# Patient Record
Sex: FEMALE | Race: WHITE | NOT HISPANIC OR LATINO | URBAN - METROPOLITAN AREA
[De-identification: names, ages, dates, MRNs, and addresses within clinical notes are randomized per-mention and may not be internally consistent; named-entity substitution may affect disease eponyms.]

---

## 2017-04-24 ENCOUNTER — OUTPATIENT (OUTPATIENT)
Dept: OUTPATIENT SERVICES | Facility: HOSPITAL | Age: 62
LOS: 1 days | End: 2017-04-24

## 2017-04-24 VITALS
DIASTOLIC BLOOD PRESSURE: 89 MMHG | HEART RATE: 88 BPM | RESPIRATION RATE: 15 BRPM | WEIGHT: 169.98 LBS | OXYGEN SATURATION: 98 % | HEIGHT: 68 IN | SYSTOLIC BLOOD PRESSURE: 143 MMHG | TEMPERATURE: 37 F

## 2017-04-24 DIAGNOSIS — M20.41 OTHER HAMMER TOE(S) (ACQUIRED), RIGHT FOOT: ICD-10-CM

## 2017-04-24 DIAGNOSIS — Z01.818 ENCOUNTER FOR OTHER PREPROCEDURAL EXAMINATION: ICD-10-CM

## 2017-04-24 DIAGNOSIS — M20.11 HALLUX VALGUS (ACQUIRED), RIGHT FOOT: ICD-10-CM

## 2017-04-24 DIAGNOSIS — Z90.710 ACQUIRED ABSENCE OF BOTH CERVIX AND UTERUS: Chronic | ICD-10-CM

## 2017-04-24 DIAGNOSIS — Z91.040 LATEX ALLERGY STATUS: ICD-10-CM

## 2017-04-24 DIAGNOSIS — Z98.82 BREAST IMPLANT STATUS: Chronic | ICD-10-CM

## 2017-04-24 DIAGNOSIS — Z98.891 HISTORY OF UTERINE SCAR FROM PREVIOUS SURGERY: Chronic | ICD-10-CM

## 2017-04-24 RX ORDER — LIDOCAINE HCL 20 MG/ML
0.2 VIAL (ML) INJECTION ONCE
Qty: 0 | Refills: 0 | Status: DISCONTINUED | OUTPATIENT
Start: 2017-04-28 | End: 2017-05-13

## 2017-04-24 RX ORDER — SODIUM CHLORIDE 9 MG/ML
3 INJECTION INTRAMUSCULAR; INTRAVENOUS; SUBCUTANEOUS EVERY 8 HOURS
Qty: 0 | Refills: 0 | Status: DISCONTINUED | OUTPATIENT
Start: 2017-04-28 | End: 2017-05-13

## 2017-04-24 NOTE — H&P PST ADULT - HISTORY OF PRESENT ILLNESS
63 yo female.   presents to PST scheduled for bunion and hammer toe surgeries., 63 yo female. c/o right foot pain with wearing tight shoes, evaluated by Dr. Salguero, diagnosed with acquired hallux valgus. presents to PST scheduled for right foot surgery.

## 2017-04-24 NOTE — H&P PST ADULT - NSANTHOSAYNRD_GEN_A_CORE
No. SAMMY screening performed.  STOP BANG Legend: 0-2 = LOW Risk; 3-4 = INTERMEDIATE Risk; 5-8 = HIGH Risk

## 2017-04-24 NOTE — H&P PST ADULT - MUSCULOSKELETAL
details… detailed exam pt did not wish to have right foot examined, stating Dr. Salguero had recently examined it

## 2017-04-24 NOTE — H&P PST ADULT - PMH
Hallux valgus (acquired), right foot    Hammer toe of right foot Hallux valgus (acquired), right foot    Hammer toe of right foot    Raynaud's disease without gangrene    Vasovagal episode

## 2017-04-27 ENCOUNTER — RESULT REVIEW (OUTPATIENT)
Age: 62
End: 2017-04-27

## 2017-04-28 ENCOUNTER — OUTPATIENT (OUTPATIENT)
Dept: OUTPATIENT SERVICES | Facility: HOSPITAL | Age: 62
LOS: 1 days | End: 2017-04-28
Payer: COMMERCIAL

## 2017-04-28 VITALS
HEART RATE: 88 BPM | OXYGEN SATURATION: 98 % | WEIGHT: 169.98 LBS | SYSTOLIC BLOOD PRESSURE: 143 MMHG | RESPIRATION RATE: 18 BRPM | DIASTOLIC BLOOD PRESSURE: 89 MMHG | HEIGHT: 68 IN | TEMPERATURE: 37 F

## 2017-04-28 VITALS
RESPIRATION RATE: 18 BRPM | OXYGEN SATURATION: 100 % | SYSTOLIC BLOOD PRESSURE: 128 MMHG | DIASTOLIC BLOOD PRESSURE: 80 MMHG | HEART RATE: 80 BPM

## 2017-04-28 DIAGNOSIS — Z98.82 BREAST IMPLANT STATUS: Chronic | ICD-10-CM

## 2017-04-28 DIAGNOSIS — Z01.818 ENCOUNTER FOR OTHER PREPROCEDURAL EXAMINATION: ICD-10-CM

## 2017-04-28 DIAGNOSIS — Z90.710 ACQUIRED ABSENCE OF BOTH CERVIX AND UTERUS: Chronic | ICD-10-CM

## 2017-04-28 DIAGNOSIS — Z98.891 HISTORY OF UTERINE SCAR FROM PREVIOUS SURGERY: Chronic | ICD-10-CM

## 2017-04-28 DIAGNOSIS — M20.41 OTHER HAMMER TOE(S) (ACQUIRED), RIGHT FOOT: ICD-10-CM

## 2017-04-28 DIAGNOSIS — M20.11 HALLUX VALGUS (ACQUIRED), RIGHT FOOT: ICD-10-CM

## 2017-04-28 PROCEDURE — C1713: CPT

## 2017-04-28 PROCEDURE — 28296 COR HLX VLGS DSTL MTAR OSTEO: CPT | Mod: T5

## 2017-04-28 PROCEDURE — 73620 X-RAY EXAM OF FOOT: CPT | Mod: 26,RT

## 2017-04-28 PROCEDURE — 73620 X-RAY EXAM OF FOOT: CPT

## 2017-04-28 PROCEDURE — 88300 SURGICAL PATH GROSS: CPT | Mod: 26

## 2017-04-28 PROCEDURE — 28285 REPAIR OF HAMMERTOE: CPT | Mod: T6

## 2017-04-28 PROCEDURE — 88300 SURGICAL PATH GROSS: CPT

## 2017-04-28 RX ORDER — ONDANSETRON 8 MG/1
4 TABLET, FILM COATED ORAL ONCE
Qty: 0 | Refills: 0 | Status: DISCONTINUED | OUTPATIENT
Start: 2017-04-28 | End: 2017-05-13

## 2017-04-28 RX ORDER — APREPITANT 80 MG/1
40 CAPSULE ORAL ONCE
Qty: 0 | Refills: 0 | Status: COMPLETED | OUTPATIENT
Start: 2017-04-28 | End: 2017-04-28

## 2017-04-28 RX ORDER — ACETAMINOPHEN 500 MG
975 TABLET ORAL ONCE
Qty: 0 | Refills: 0 | Status: COMPLETED | OUTPATIENT
Start: 2017-04-28 | End: 2017-04-28

## 2017-04-28 RX ORDER — SODIUM CHLORIDE 9 MG/ML
1000 INJECTION, SOLUTION INTRAVENOUS
Qty: 0 | Refills: 0 | Status: DISCONTINUED | OUTPATIENT
Start: 2017-04-28 | End: 2017-05-13

## 2017-04-28 RX ORDER — CELECOXIB 200 MG/1
200 CAPSULE ORAL ONCE
Qty: 0 | Refills: 0 | Status: COMPLETED | OUTPATIENT
Start: 2017-04-28 | End: 2017-04-28

## 2017-04-28 RX ORDER — CELECOXIB 200 MG/1
200 CAPSULE ORAL ONCE
Qty: 0 | Refills: 0 | Status: DISCONTINUED | OUTPATIENT
Start: 2017-04-28 | End: 2017-05-13

## 2017-04-28 RX ADMIN — APREPITANT 40 MILLIGRAM(S): 80 CAPSULE ORAL at 06:52

## 2017-04-28 RX ADMIN — CELECOXIB 200 MILLIGRAM(S): 200 CAPSULE ORAL at 06:52

## 2017-04-28 RX ADMIN — Medication 975 MILLIGRAM(S): at 06:52

## 2017-04-28 NOTE — BRIEF OPERATIVE NOTE - POST-OP DX
Bunion of great toe of right foot  04/28/2017    Active  Rosaura Ballesteros  Hammer toe of second toe of right foot  04/28/2017    Active  Rosaura Ballesteros

## 2017-04-28 NOTE — ASU DISCHARGE PLAN (ADULT/PEDIATRIC). - NOTIFY
Pain not relieved by Medications/Numbness, color, or temperature change to extremity/Swelling that continues/Bleeding that does not stop/Numbness, tingling/Fever greater than 101/Persistent Nausea and Vomiting

## 2017-04-28 NOTE — ASU PATIENT PROFILE, ADULT - PMH
Hallux valgus (acquired), right foot    Hammer toe of right foot    Raynaud's disease without gangrene    Vasovagal episode

## 2017-04-28 NOTE — BRIEF OPERATIVE NOTE - PRE-OP DX
Bunion of great toe of right foot  04/28/2017    Active  Rosaura Ballesteros  Hammer toe of right foot  04/28/2017    Active  Rosaura Ballesteros

## 2017-05-03 ENCOUNTER — TRANSCRIPTION ENCOUNTER (OUTPATIENT)
Age: 62
End: 2017-05-03

## 2020-08-19 PROBLEM — I73.00 RAYNAUD'S SYNDROME WITHOUT GANGRENE: Chronic | Status: ACTIVE | Noted: 2017-04-24

## 2020-08-24 ENCOUNTER — APPOINTMENT (OUTPATIENT)
Dept: UROLOGY | Facility: CLINIC | Age: 65
End: 2020-08-24
Payer: COMMERCIAL

## 2020-08-24 VITALS
DIASTOLIC BLOOD PRESSURE: 92 MMHG | WEIGHT: 180 LBS | TEMPERATURE: 97.4 F | OXYGEN SATURATION: 97 % | HEART RATE: 83 BPM | BODY MASS INDEX: 26.66 KG/M2 | SYSTOLIC BLOOD PRESSURE: 142 MMHG | HEIGHT: 69 IN | RESPIRATION RATE: 14 BRPM

## 2020-08-24 VITALS
BODY MASS INDEX: 26.66 KG/M2 | RESPIRATION RATE: 14 BRPM | WEIGHT: 180 LBS | OXYGEN SATURATION: 97 % | HEIGHT: 69 IN | DIASTOLIC BLOOD PRESSURE: 92 MMHG | TEMPERATURE: 97.4 F | SYSTOLIC BLOOD PRESSURE: 142 MMHG | HEART RATE: 83 BPM

## 2020-08-24 DIAGNOSIS — Z72.89 OTHER PROBLEMS RELATED TO LIFESTYLE: ICD-10-CM

## 2020-08-24 DIAGNOSIS — E78.00 PURE HYPERCHOLESTEROLEMIA, UNSPECIFIED: ICD-10-CM

## 2020-08-24 DIAGNOSIS — Z86.39 PERSONAL HISTORY OF OTHER ENDOCRINE, NUTRITIONAL AND METABOLIC DISEASE: ICD-10-CM

## 2020-08-24 DIAGNOSIS — Z84.1 FAMILY HISTORY OF DISORDERS OF KIDNEY AND URETER: ICD-10-CM

## 2020-08-24 PROCEDURE — 99203 OFFICE O/P NEW LOW 30 MIN: CPT

## 2020-08-24 RX ORDER — SIMVASTATIN 20 MG/1
20 TABLET, FILM COATED ORAL
Qty: 90 | Refills: 0 | Status: ACTIVE | COMMUNITY
Start: 2020-08-04

## 2020-08-24 RX ORDER — ZOLPIDEM TARTRATE 10 MG/1
10 TABLET ORAL
Qty: 30 | Refills: 0 | Status: ACTIVE | COMMUNITY
Start: 2020-07-27

## 2020-08-24 RX ORDER — ALPRAZOLAM 0.25 MG/1
0.25 TABLET ORAL
Qty: 30 | Refills: 0 | Status: ACTIVE | COMMUNITY
Start: 2020-08-11

## 2020-08-24 NOTE — PHYSICAL EXAM
[General Appearance - Well Developed] : well developed [General Appearance - Well Nourished] : well nourished [Well Groomed] : well groomed [Normal Appearance] : normal appearance [Edema] : no peripheral edema [General Appearance - In No Acute Distress] : no acute distress [Respiration, Rhythm And Depth] : normal respiratory rhythm and effort [Exaggerated Use Of Accessory Muscles For Inspiration] : no accessory muscle use [Abdomen Soft] : soft [Costovertebral Angle Tenderness] : no ~M costovertebral angle tenderness [Abdomen Tenderness] : non-tender [Normal Station and Gait] : the gait and station were normal for the patient's age [] : no rash [No Focal Deficits] : no focal deficits [Oriented To Time, Place, And Person] : oriented to person, place, and time [Affect] : the affect was normal [Mood] : the mood was normal [Not Anxious] : not anxious

## 2020-08-26 LAB
APPEARANCE: ABNORMAL
BACTERIA UR CULT: NORMAL
BACTERIA: NEGATIVE
BILIRUBIN URINE: ABNORMAL
BLOOD URINE: ABNORMAL
COLOR: ABNORMAL
GLUCOSE QUALITATIVE U: ABNORMAL
HYALINE CASTS: 0 /LPF
KETONES URINE: ABNORMAL
LEUKOCYTE ESTERASE URINE: ABNORMAL
MICROSCOPIC-UA: NORMAL
NITRITE URINE: ABNORMAL
PH URINE: ABNORMAL
PROTEIN URINE: ABNORMAL
RED BLOOD CELLS URINE: >720 /HPF
SPECIFIC GRAVITY URINE: ABNORMAL
SQUAMOUS EPITHELIAL CELLS: 1 /HPF
UROBILINOGEN URINE: ABNORMAL
WHITE BLOOD CELLS URINE: 133 /HPF

## 2020-09-02 RX ORDER — PREDNISONE 10 MG/1
10 TABLET ORAL
Qty: 25 | Refills: 0 | Status: DISCONTINUED | COMMUNITY
Start: 2020-02-24 | End: 2020-09-02

## 2020-09-02 RX ORDER — MOMETASONE FUROATE 1 MG/ML
0.1 SOLUTION TOPICAL
Qty: 60 | Refills: 0 | Status: DISCONTINUED | COMMUNITY
Start: 2020-04-13 | End: 2020-09-02

## 2020-09-02 RX ORDER — LEVOFLOXACIN 500 MG/1
500 TABLET, FILM COATED ORAL
Qty: 7 | Refills: 0 | Status: DISCONTINUED | COMMUNITY
Start: 2020-02-28 | End: 2020-09-02

## 2020-09-02 RX ORDER — HYDROXYCHLOROQUINE SULFATE 200 MG/1
200 TABLET, FILM COATED ORAL
Qty: 40 | Refills: 0 | Status: DISCONTINUED | COMMUNITY
Start: 2020-03-20 | End: 2020-09-02

## 2020-09-02 RX ORDER — FLUOXETINE HYDROCHLORIDE 10 MG/1
10 CAPSULE ORAL
Qty: 60 | Refills: 0 | Status: DISCONTINUED | COMMUNITY
Start: 2020-04-16 | End: 2020-09-02

## 2020-09-02 RX ORDER — AZITHROMYCIN 250 MG/1
250 TABLET, FILM COATED ORAL
Qty: 6 | Refills: 0 | Status: DISCONTINUED | COMMUNITY
Start: 2020-03-20 | End: 2020-09-02

## 2020-09-02 RX ORDER — CLOBETASOL PROPIONATE 0.05 G/100ML
0.05 SHAMPOO TOPICAL
Qty: 118 | Refills: 0 | Status: DISCONTINUED | COMMUNITY
Start: 2020-04-13 | End: 2020-09-02

## 2020-09-02 RX ORDER — ONDANSETRON 4 MG/1
4 TABLET, ORALLY DISINTEGRATING ORAL
Qty: 8 | Refills: 0 | Status: DISCONTINUED | COMMUNITY
Start: 2020-08-13 | End: 2020-09-02

## 2020-09-02 RX ORDER — CIPROFLOXACIN HYDROCHLORIDE 500 MG/1
500 TABLET, FILM COATED ORAL
Qty: 18 | Refills: 0 | Status: DISCONTINUED | COMMUNITY
Start: 2020-08-13 | End: 2020-09-02

## 2020-09-02 RX ORDER — FLUOXETINE HYDROCHLORIDE 20 MG/1
20 CAPSULE ORAL
Qty: 60 | Refills: 0 | Status: DISCONTINUED | COMMUNITY
Start: 2020-06-30 | End: 2020-09-02

## 2020-09-02 RX ORDER — BUSPIRONE HYDROCHLORIDE 30 MG/1
30 TABLET ORAL
Qty: 60 | Refills: 0 | Status: DISCONTINUED | COMMUNITY
Start: 2020-03-21 | End: 2020-09-02

## 2020-09-02 RX ORDER — ARIPIPRAZOLE 2 MG/1
2 TABLET ORAL
Qty: 30 | Refills: 0 | Status: DISCONTINUED | COMMUNITY
Start: 2020-03-24 | End: 2020-09-02

## 2020-09-02 NOTE — HISTORY OF PRESENT ILLNESS
[FreeTextEntry1] : She is a 65-year-old woman who is seen today for initial visit. She developed significant right flank pain and went to the emergency room at [facility] in August 13, 2020. She did not have any fever. She was told to have a stone and a stent was placed. She does not have previous history of kidney stones. Prior to this, she did not have significant urinary symptoms. CT scan showed 1-1.5 cm right mid to distal ureteral stone. She was also placed on Cipro for urinary tract infection. Her father had kidney stones.\par Addendum: Records from the hospital showed CT scan without contrast showed an 11 mm right mid ureteral stone, 3.2 cm right adnexal cyst. White blood cell count was 11.1, creatinine 0.9, urine culture showed enterococcus and Escherichia coli. She was prescribed Cipro. 6 x 26 stent was placed on August 13, 2020.

## 2020-09-02 NOTE — ASSESSMENT
[FreeTextEntry1] : Kidney stones may be completely asymptomatic or cause mild to severe flank pain radiating to the front. Renal colic is generally associated with nausea and vomiting. Kidney stones can vary in size and shape. The main types are calcium, uric acid, struvite and cystine stones. Diagnostic studies for nephrolithiasis may include urine studies, imaging studies with CT scan, x-ray or ultrasound. Asymptomatic renal stones could be observed or surgical treatment options may be considered. Small ureteral stones generally can pass spontaneously with pain management, hydration and alpha-blocker therapy. Persistent nausea and vomiting, fever, chills and uncontrolled pain require visit to the emergency room. \par Surgical treatment options are shockwave lithotripsy, laser lithotripsy with ureteroscopy and percutaneous stone removal. After any of these treatments a stent or a drainage catheter may be used. Generally, the location, size of the stone and comorbid factors dictate which treatment is the best option. Risks of the above procedures include fever, chills, urinary retention, injury to the urinary system, staged procedure, etc. She will be scheduled for ureteroscopy and laser lithotripsy in the near future.

## 2020-09-08 ENCOUNTER — OUTPATIENT (OUTPATIENT)
Dept: OUTPATIENT SERVICES | Facility: HOSPITAL | Age: 65
LOS: 1 days | End: 2020-09-08
Payer: COMMERCIAL

## 2020-09-08 VITALS
WEIGHT: 194.01 LBS | SYSTOLIC BLOOD PRESSURE: 162 MMHG | HEART RATE: 94 BPM | RESPIRATION RATE: 18 BRPM | HEIGHT: 69 IN | TEMPERATURE: 99 F | OXYGEN SATURATION: 98 % | DIASTOLIC BLOOD PRESSURE: 83 MMHG

## 2020-09-08 DIAGNOSIS — Z98.890 OTHER SPECIFIED POSTPROCEDURAL STATES: Chronic | ICD-10-CM

## 2020-09-08 DIAGNOSIS — Z98.891 HISTORY OF UTERINE SCAR FROM PREVIOUS SURGERY: Chronic | ICD-10-CM

## 2020-09-08 DIAGNOSIS — Z98.82 BREAST IMPLANT STATUS: Chronic | ICD-10-CM

## 2020-09-08 DIAGNOSIS — Z01.818 ENCOUNTER FOR OTHER PREPROCEDURAL EXAMINATION: ICD-10-CM

## 2020-09-08 DIAGNOSIS — Z90.710 ACQUIRED ABSENCE OF BOTH CERVIX AND UTERUS: Chronic | ICD-10-CM

## 2020-09-08 DIAGNOSIS — N20.0 CALCULUS OF KIDNEY: ICD-10-CM

## 2020-09-08 NOTE — H&P PST ADULT - NSICDXPASTSURGICALHX_GEN_ALL_CORE_FT
PAST SURGICAL HISTORY:  H/O breast augmentation     H/O: hysterectomy     History of  section x2    S/P bunionectomy

## 2020-09-08 NOTE — H&P PST ADULT - NSICDXPROBLEM_GEN_ALL_CORE_FT
PROBLEM DIAGNOSES  Problem: Renal calculus  Assessment and Plan: Cystoscopy, right semirigid and flexible ureteroscopy, laser lithotripsy, stent placement, possible ESWL

## 2020-09-08 NOTE — H&P PST ADULT - HISTORY OF PRESENT ILLNESS
This is a 64 y/o female PMH presented to [place] with right flank pain and found to have nephrolithiasis, S/P right ureteral stent placement.  Presents today for cystoscopy, right semirigid and flexible ureteroscopy, laser lithotripsy and stent placement This is a 66 y/o female PMH presented to [facility] with right flank pain and found to have nephrolithiasis, S/P right ureteral stent placement.  Presents today for cystoscopy, right semirigid and flexible ureteroscopy, laser lithotripsy and stent placement, possible ESWL.

## 2020-09-08 NOTE — H&P PST ADULT - NSICDXPASTMEDICALHX_GEN_ALL_CORE_FT
PAST MEDICAL HISTORY:  Hallux valgus (acquired), right foot     Hammer toe of right foot     Raynaud's disease without gangrene     Vasovagal episode

## 2020-09-10 DIAGNOSIS — Z01.818 ENCOUNTER FOR OTHER PREPROCEDURAL EXAMINATION: ICD-10-CM

## 2020-09-12 ENCOUNTER — APPOINTMENT (OUTPATIENT)
Dept: DISASTER EMERGENCY | Facility: CLINIC | Age: 65
End: 2020-09-12

## 2020-09-12 ENCOUNTER — LABORATORY RESULT (OUTPATIENT)
Age: 65
End: 2020-09-12

## 2020-09-14 ENCOUNTER — TRANSCRIPTION ENCOUNTER (OUTPATIENT)
Age: 65
End: 2020-09-14

## 2020-09-15 ENCOUNTER — RESULT REVIEW (OUTPATIENT)
Age: 65
End: 2020-09-15

## 2020-09-15 ENCOUNTER — APPOINTMENT (OUTPATIENT)
Dept: UROLOGY | Facility: HOSPITAL | Age: 65
End: 2020-09-15

## 2020-09-15 ENCOUNTER — OUTPATIENT (OUTPATIENT)
Dept: OUTPATIENT SERVICES | Facility: HOSPITAL | Age: 65
LOS: 1 days | End: 2020-09-15
Payer: COMMERCIAL

## 2020-09-15 VITALS
SYSTOLIC BLOOD PRESSURE: 125 MMHG | DIASTOLIC BLOOD PRESSURE: 87 MMHG | RESPIRATION RATE: 17 BRPM | HEIGHT: 69 IN | TEMPERATURE: 98 F | WEIGHT: 194.01 LBS | OXYGEN SATURATION: 95 % | HEART RATE: 90 BPM

## 2020-09-15 VITALS
SYSTOLIC BLOOD PRESSURE: 135 MMHG | OXYGEN SATURATION: 92 % | RESPIRATION RATE: 16 BRPM | DIASTOLIC BLOOD PRESSURE: 74 MMHG | HEART RATE: 100 BPM

## 2020-09-15 DIAGNOSIS — Z98.891 HISTORY OF UTERINE SCAR FROM PREVIOUS SURGERY: Chronic | ICD-10-CM

## 2020-09-15 DIAGNOSIS — Z98.890 OTHER SPECIFIED POSTPROCEDURAL STATES: Chronic | ICD-10-CM

## 2020-09-15 DIAGNOSIS — Z98.82 BREAST IMPLANT STATUS: Chronic | ICD-10-CM

## 2020-09-15 DIAGNOSIS — Z90.710 ACQUIRED ABSENCE OF BOTH CERVIX AND UTERUS: Chronic | ICD-10-CM

## 2020-09-15 DIAGNOSIS — N20.0 CALCULUS OF KIDNEY: ICD-10-CM

## 2020-09-15 DIAGNOSIS — N20.9 URINARY CALCULUS, UNSPECIFIED: ICD-10-CM

## 2020-09-15 PROCEDURE — 87186 SC STD MICRODIL/AGAR DIL: CPT

## 2020-09-15 PROCEDURE — 74420 UROGRAPHY RTRGR +-KUB: CPT | Mod: 26

## 2020-09-15 PROCEDURE — 76000 FLUOROSCOPY <1 HR PHYS/QHP: CPT

## 2020-09-15 PROCEDURE — 52356 CYSTO/URETERO W/LITHOTRIPSY: CPT | Mod: RT

## 2020-09-15 PROCEDURE — 74018 RADEX ABDOMEN 1 VIEW: CPT | Mod: 26

## 2020-09-15 PROCEDURE — 87086 URINE CULTURE/COLONY COUNT: CPT

## 2020-09-15 PROCEDURE — 88300 SURGICAL PATH GROSS: CPT | Mod: 26

## 2020-09-15 PROCEDURE — G0463: CPT

## 2020-09-15 RX ORDER — ONDANSETRON 8 MG/1
4 TABLET, FILM COATED ORAL ONCE
Refills: 0 | Status: DISCONTINUED | OUTPATIENT
Start: 2020-09-15 | End: 2020-09-15

## 2020-09-15 RX ORDER — LIDOCAINE HCL 20 MG/ML
0.2 VIAL (ML) INJECTION ONCE
Refills: 0 | Status: COMPLETED | OUTPATIENT
Start: 2020-09-15 | End: 2020-09-15

## 2020-09-15 RX ORDER — SODIUM CHLORIDE 9 MG/ML
3 INJECTION INTRAMUSCULAR; INTRAVENOUS; SUBCUTANEOUS EVERY 8 HOURS
Refills: 0 | Status: DISCONTINUED | OUTPATIENT
Start: 2020-09-15 | End: 2020-09-15

## 2020-09-15 RX ORDER — OXYCODONE HYDROCHLORIDE 5 MG/1
5 TABLET ORAL ONCE
Refills: 0 | Status: DISCONTINUED | OUTPATIENT
Start: 2020-09-15 | End: 2020-09-15

## 2020-09-15 RX ORDER — HYDROMORPHONE HYDROCHLORIDE 2 MG/ML
0.25 INJECTION INTRAMUSCULAR; INTRAVENOUS; SUBCUTANEOUS
Refills: 0 | Status: DISCONTINUED | OUTPATIENT
Start: 2020-09-15 | End: 2020-09-15

## 2020-09-15 RX ORDER — CIPROFLOXACIN LACTATE 400MG/40ML
400 VIAL (ML) INTRAVENOUS ONCE
Refills: 0 | Status: DISCONTINUED | OUTPATIENT
Start: 2020-09-15 | End: 2020-09-15

## 2020-09-15 RX ORDER — TRAMADOL HYDROCHLORIDE 50 MG/1
1 TABLET ORAL
Qty: 5 | Refills: 0
Start: 2020-09-15

## 2020-09-15 RX ADMIN — OXYCODONE HYDROCHLORIDE 5 MILLIGRAM(S): 5 TABLET ORAL at 15:44

## 2020-09-15 RX ADMIN — SODIUM CHLORIDE 3 MILLILITER(S): 9 INJECTION INTRAMUSCULAR; INTRAVENOUS; SUBCUTANEOUS at 11:21

## 2020-09-15 RX ADMIN — Medication 0.2 MILLILITER(S): at 11:21

## 2020-09-15 NOTE — ASU DISCHARGE PLAN (ADULT/PEDIATRIC) - CALL YOUR DOCTOR IF YOU HAVE ANY OF THE FOLLOWING:
Fever greater than (need to indicate Fahrenheit or Celsius)/Inability to tolerate liquids or foods/Bleeding that does not stop/Pain not relieved by Medications/Unable to urinate 0 = independent

## 2020-09-15 NOTE — ASU DISCHARGE PLAN (ADULT/PEDIATRIC) - ASU DC SPECIAL INSTRUCTIONSFT
You may take up to 650mg of tylenol every 6 hours for pain.  You can alternate this with ibuprofen 400mg every 6 hours.  Do not take more than 4000mg of tylenol or 2400mg of ibuprofen in one day.    Please follow-up with Patrick Pagan in 1-2 weeks. You may take up to 650mg of tylenol every 6 hours for pain.  You can alternate this with ibuprofen 400mg every 6 hours.  Do not take more than 4000mg of tylenol or 2400mg of ibuprofen in one day.    Please follow-up with Patrick Pagan in 1-2 weeks.    Do not take the Tramadol with either Ambien or Xanax

## 2020-09-15 NOTE — ASU DISCHARGE PLAN (ADULT/PEDIATRIC) - CARE PROVIDER_API CALL
Byron Marin)  Urology  [address], [place] [address]  Phone: [phone]  Fax: [phone]  Follow Up Time:

## 2020-09-15 NOTE — BRIEF OPERATIVE NOTE - NSICDXBRIEFPROCEDURE_GEN_ALL_CORE_FT
PROCEDURES:  Left ureteroscopy with lithotripsy 15-Sep-2020 13:53:52  Asa Man   PROCEDURES:  Ureteroscopy with laser lithotripsy 15-Sep-2020 13:58:22  Asa Man

## 2020-09-16 PROCEDURE — 74018 RADEX ABDOMEN 1 VIEW: CPT

## 2020-09-16 PROCEDURE — 87086 URINE CULTURE/COLONY COUNT: CPT

## 2020-09-16 PROCEDURE — C1769: CPT

## 2020-09-16 PROCEDURE — 88300 SURGICAL PATH GROSS: CPT

## 2020-09-16 PROCEDURE — C1758: CPT

## 2020-09-16 PROCEDURE — 52356 CYSTO/URETERO W/LITHOTRIPSY: CPT | Mod: RT

## 2020-09-16 PROCEDURE — C2617: CPT

## 2020-09-16 PROCEDURE — C1889: CPT

## 2020-09-16 PROCEDURE — 82365 CALCULUS SPECTROSCOPY: CPT

## 2020-09-18 ENCOUNTER — APPOINTMENT (OUTPATIENT)
Dept: UROLOGY | Facility: CLINIC | Age: 65
End: 2020-09-18
Payer: COMMERCIAL

## 2020-09-18 DIAGNOSIS — N20.0 CALCULUS OF KIDNEY: ICD-10-CM

## 2020-09-18 LAB
CULTURE RESULTS: SIGNIFICANT CHANGE UP
SPECIMEN SOURCE: SIGNIFICANT CHANGE UP

## 2020-09-18 PROCEDURE — 52310 CYSTOSCOPY AND TREATMENT: CPT

## 2020-09-18 RX ORDER — CIPROFLOXACIN HYDROCHLORIDE 500 MG/1
500 TABLET, FILM COATED ORAL
Qty: 10 | Refills: 0 | Status: DISCONTINUED | COMMUNITY
Start: 2020-09-11 | End: 2020-09-18

## 2020-09-22 LAB — NIDUS STONE QN: SIGNIFICANT CHANGE UP

## 2020-09-23 LAB — SURGICAL PATHOLOGY STUDY: SIGNIFICANT CHANGE UP

## 2020-11-06 ENCOUNTER — TRANSCRIPTION ENCOUNTER (OUTPATIENT)
Age: 65
End: 2020-11-06

## 2020-11-06 ENCOUNTER — APPOINTMENT (OUTPATIENT)
Dept: SURGERY | Facility: CLINIC | Age: 65
End: 2020-11-06
Payer: COMMERCIAL

## 2020-11-06 VITALS
HEART RATE: 66 BPM | RESPIRATION RATE: 16 BRPM | WEIGHT: 202 LBS | TEMPERATURE: 98 F | HEIGHT: 68 IN | SYSTOLIC BLOOD PRESSURE: 159 MMHG | BODY MASS INDEX: 30.62 KG/M2 | DIASTOLIC BLOOD PRESSURE: 91 MMHG | OXYGEN SATURATION: 98 %

## 2020-11-06 PROCEDURE — 99204 OFFICE O/P NEW MOD 45 MIN: CPT

## 2020-11-06 PROCEDURE — 99072 ADDL SUPL MATRL&STAF TM PHE: CPT

## 2020-11-06 RX ORDER — TAMSULOSIN HYDROCHLORIDE 0.4 MG/1
0.4 CAPSULE ORAL
Qty: 14 | Refills: 0 | Status: DISCONTINUED | COMMUNITY
Start: 2020-08-13 | End: 2020-11-06

## 2020-11-06 RX ORDER — VENLAFAXINE HYDROCHLORIDE 37.5 MG/1
37.5 CAPSULE, EXTENDED RELEASE ORAL
Qty: 90 | Refills: 0 | Status: DISCONTINUED | COMMUNITY
Start: 2020-07-28 | End: 2020-11-06

## 2020-11-06 NOTE — HISTORY OF PRESENT ILLNESS
[de-identified] : Lashon is a 66 y/o female here for consultation visit. CT from 11/6/20 demonstrated a supraumbilical midline anterior abdominal wall hernia. There is fat present within the hernia sac which demonstrates streaky increased attenuation consistent with inflammation.  [de-identified] : For more than a year patient has been experiencing a sense of abdominal bloating and relatively early satiety. She was seen by GI and underwent a CT scan which showed a small fat containing umbilical hernia and a somewhat larger fat containing epigastric hernia. She is also known to have an element of diastasis and at times has experienced some "burning" pain in the area of the epigastric bulge. In addition, she has gained at least 30 pounds over the last 2-3 years. Bowel movements remain normal.

## 2020-11-06 NOTE — ASSESSMENT
[FreeTextEntry1] : 65-year-old overweight female with diastasis recti, epigastric and umbilical hernias with main complaint of abdominal bloating and early satiety. Her complaints are unlikely to be related to either hernia but more likely related to her significant weight gain.

## 2020-11-06 NOTE — PLAN
[FreeTextEntry1] : Lengthy discussion with patient and  regarding the nature of, indications for and risks/benefits of epigastric and umbilical hernia repair. Also discussed possibility of coincident procedure with plastics to repair the diastasis and perform abdominoplasty. Patient advised to follow up with her plastic surgeon regarding above-mentioned procedures and then decide how she would prefer to proceed.

## 2020-11-06 NOTE — CONSULT LETTER
[Dear  ___] : Dear ~MORGAN, [Sincerely,] : Sincerely, [FreeTextEntry2] : Dr. Babak Hazel [FreeTextEntry1] : At your recommendation I saw Lashon Baker in the office on November sixth for evaluation of abdominal wall hernias. She stated that for the last year or so she has been experiencing postprandial abdominal bloating and early satiety. She was seen by her gastroenterologist and eventually referred for a CT scan which demonstrated a small, fat-containing umbilical hernia as well as a somewhat larger, fat containing epigastric hernia. In addition to the above-mentioned complaints Ms. Baker has also noted occasional local discomfort in the area of the epigastric bulge and she admitted to a more than 30 pound weight gain over the past several years.\par \par On exam, the patient was noted to be overweight. The abdomen was soft, nontender and non-distended though somewhat protuberant. No masses or organomegaly were palpable but a moderate degree of upper midline diastasis was present. At the umbilicus, a 1.5 cm nontender, reducible, fat-containing hernia was apparent and several centimeters above the umbilicus, a 4-5 cm, nontender, reducible epigastric hernia was also noted. The remainder of the exam was noncontributory except for the presence of a well-healed lower midline surgical scar related to previous  x2.\par \par I discussed the situation at considerable length with Ms. Baker and her  and while I do feel that the epigastric and umbilical hernias should be repaired, I am concerned that most of her symptoms of bloating and early satiety are primarily related to her weight gain. In any case, I suggested that she follow up with you for a discussion regarding possible abdominoplasty and diastasis repair after which decision can be made as to which if any procedures will be undertaken. Thanks very much for allowing me to see this pleasant woman's and I look forward to the possibility of participating with you in her care. [FreeTextEntry3] : \par \par Frankie Marcus M.D., F.A.C.S. [DrPatrick  ___] : Dr. KAHN

## 2020-11-23 ENCOUNTER — OUTPATIENT (OUTPATIENT)
Dept: OUTPATIENT SERVICES | Facility: HOSPITAL | Age: 65
LOS: 1 days | End: 2020-11-23
Payer: COMMERCIAL

## 2020-11-23 VITALS
TEMPERATURE: 98 F | SYSTOLIC BLOOD PRESSURE: 125 MMHG | HEART RATE: 83 BPM | OXYGEN SATURATION: 98 % | DIASTOLIC BLOOD PRESSURE: 70 MMHG | WEIGHT: 199.08 LBS | RESPIRATION RATE: 16 BRPM | HEIGHT: 68.5 IN

## 2020-11-23 DIAGNOSIS — K46.9 UNSPECIFIED ABDOMINAL HERNIA WITHOUT OBSTRUCTION OR GANGRENE: ICD-10-CM

## 2020-11-23 DIAGNOSIS — Z98.891 HISTORY OF UTERINE SCAR FROM PREVIOUS SURGERY: Chronic | ICD-10-CM

## 2020-11-23 DIAGNOSIS — N20.0 CALCULUS OF KIDNEY: Chronic | ICD-10-CM

## 2020-11-23 DIAGNOSIS — Z01.818 ENCOUNTER FOR OTHER PREPROCEDURAL EXAMINATION: ICD-10-CM

## 2020-11-23 DIAGNOSIS — Z98.890 OTHER SPECIFIED POSTPROCEDURAL STATES: Chronic | ICD-10-CM

## 2020-11-23 DIAGNOSIS — K43.9 VENTRAL HERNIA WITHOUT OBSTRUCTION OR GANGRENE: ICD-10-CM

## 2020-11-23 DIAGNOSIS — Z98.82 BREAST IMPLANT STATUS: Chronic | ICD-10-CM

## 2020-11-23 DIAGNOSIS — K42.9 UMBILICAL HERNIA WITHOUT OBSTRUCTION OR GANGRENE: ICD-10-CM

## 2020-11-23 DIAGNOSIS — Z90.710 ACQUIRED ABSENCE OF BOTH CERVIX AND UTERUS: Chronic | ICD-10-CM

## 2020-11-23 LAB
ANION GAP SERPL CALC-SCNC: 16 MMOL/L — SIGNIFICANT CHANGE UP (ref 5–17)
BUN SERPL-MCNC: 11 MG/DL — SIGNIFICANT CHANGE UP (ref 7–23)
CALCIUM SERPL-MCNC: 10 MG/DL — SIGNIFICANT CHANGE UP (ref 8.4–10.5)
CHLORIDE SERPL-SCNC: 104 MMOL/L — SIGNIFICANT CHANGE UP (ref 96–108)
CO2 SERPL-SCNC: 20 MMOL/L — LOW (ref 22–31)
CREAT SERPL-MCNC: 0.68 MG/DL — SIGNIFICANT CHANGE UP (ref 0.5–1.3)
GLUCOSE SERPL-MCNC: 99 MG/DL — SIGNIFICANT CHANGE UP (ref 70–99)
HCT VFR BLD CALC: 46 % — HIGH (ref 34.5–45)
HGB BLD-MCNC: 15.6 G/DL — HIGH (ref 11.5–15.5)
MCHC RBC-ENTMCNC: 32.1 PG — SIGNIFICANT CHANGE UP (ref 27–34)
MCHC RBC-ENTMCNC: 33.9 GM/DL — SIGNIFICANT CHANGE UP (ref 32–36)
MCV RBC AUTO: 94.7 FL — SIGNIFICANT CHANGE UP (ref 80–100)
NRBC # BLD: 0 /100 WBCS — SIGNIFICANT CHANGE UP (ref 0–0)
PLATELET # BLD AUTO: 333 K/UL — SIGNIFICANT CHANGE UP (ref 150–400)
POTASSIUM SERPL-MCNC: 4.3 MMOL/L — SIGNIFICANT CHANGE UP (ref 3.5–5.3)
POTASSIUM SERPL-SCNC: 4.3 MMOL/L — SIGNIFICANT CHANGE UP (ref 3.5–5.3)
RBC # BLD: 4.86 M/UL — SIGNIFICANT CHANGE UP (ref 3.8–5.2)
RBC # FLD: 11.9 % — SIGNIFICANT CHANGE UP (ref 10.3–14.5)
SODIUM SERPL-SCNC: 140 MMOL/L — SIGNIFICANT CHANGE UP (ref 135–145)
WBC # BLD: 6.74 K/UL — SIGNIFICANT CHANGE UP (ref 3.8–10.5)
WBC # FLD AUTO: 6.74 K/UL — SIGNIFICANT CHANGE UP (ref 3.8–10.5)

## 2020-11-23 PROCEDURE — G0463: CPT

## 2020-11-23 PROCEDURE — 87641 MR-STAPH DNA AMP PROBE: CPT

## 2020-11-23 PROCEDURE — 80048 BASIC METABOLIC PNL TOTAL CA: CPT

## 2020-11-23 PROCEDURE — 87640 STAPH A DNA AMP PROBE: CPT

## 2020-11-23 PROCEDURE — 85027 COMPLETE CBC AUTOMATED: CPT

## 2020-11-23 NOTE — H&P PST ADULT - ATTENDING PHYSICIAN: I HAVE REVIEWED THE CLINICAL DOCUMENTATION AND AGREE WITH THE ABOVE NOTE
Giving oral antibiotic. Will watch baby for any signs of allergies due to mothers allergy to PCN.  
No sign of allergic reaction noted after watching baby for 30 minutes.  
Statement Selected

## 2020-11-23 NOTE — H&P PST ADULT - NSICDXPASTSURGICALHX_GEN_ALL_CORE_FT
PAST SURGICAL HISTORY:  H/O breast augmentation implants placed    H/O: hysterectomy h/o removal benign tumor    History of  section x2    Renal calculi s/p R ESWL /Cystoscopy 2020    S/P bunionectomy 2017 with hammertoe repair     PAST SURGICAL HISTORY:  H/O breast augmentation implants placed    H/O: hysterectomy h/o removal benign tumor    History of  section x2    Renal calculi s/p Cystoscopy/laser lithotripsy  2020    S/P bunionectomy 2017 with hammertoe repair

## 2020-11-23 NOTE — H&P PST ADULT - NSICDXPROBLEM_GEN_ALL_CORE_FT
PROBLEM DIAGNOSES  Problem: Abdominal hernia  Assessment and Plan: Epigastric hernia repair  umbilical hernia repair  Preop covid tesiing on 11/27/2020  preop cbc, bmp, MRSA/MSSA(nasal swab ) sent

## 2020-11-23 NOTE — H&P PST ADULT - HISTORY OF PRESENT ILLNESS
65 year old female with  "bloating &  abdominal discomfort for a year" & S/p GI consult/ CT scan of abdomen demonstrated supraumbilical anterior abdominal wall hernia .Presents for scheduled Epigastric hernia repair &  umbilical hernia repair on 11/30/2020.  **** Preop Covid testing on 11/27/2020@ [facility]. 65 year old female s/p ureteroscopy & laser lithotripsy(09/2020) for right renal calculi & now with  "bloating &  abdominal discomfort for a year" & S/p GI consult/ CT scan of abdomen demonstrated supraumbilical anterior abdominal wall hernia  & presents for scheduled Epigastric hernia repair &  umbilical hernia repair on 11/30/2020.  **** [facility] 65 year [facility] female s/p ureteroscopy & laser lithotripsy(09/2020) for right renal calculi & now with  "bloating & abdominal discomfort for a year" & S/p GI consult/ CT scan of abdomen demonstrated supraumbilical anterior abdominal wall hernia  & presents for scheduled Epigastric hernia repair &  umbilical hernia repair on 11/30/2020.  **** Preop Covid testing on 11/27/2020@ [facility].

## 2020-11-23 NOTE — H&P PST ADULT - NSICDXPASTMEDICALHX_GEN_ALL_CORE_FT
PAST MEDICAL HISTORY:  Anxiety     H/O renal calculi     Hallux valgus (acquired), right foot     Hammer toe of right foot h/o surgery    Raynaud's disease without gangrene     Vasovagal episode 5 years ago  h/o cardiac work up done, came out normal'     PAST MEDICAL HISTORY:  Anxiety     H/O renal calculi s/p right laser lithotripsy 09/2020    Hallux valgus (acquired), right foot s/p surgery    Hammer toe of right foot h/o surgery    HLD (hyperlipidemia)     Raynaud's disease without gangrene     Vasovagal episode 5 years ago  h/o cardiac work up done, came out normal'

## 2020-11-24 LAB
MRSA PCR RESULT.: SIGNIFICANT CHANGE UP
S AUREUS DNA NOSE QL NAA+PROBE: SIGNIFICANT CHANGE UP

## 2020-11-27 ENCOUNTER — OUTPATIENT (OUTPATIENT)
Dept: OUTPATIENT SERVICES | Facility: HOSPITAL | Age: 65
LOS: 1 days | End: 2020-11-27
Payer: COMMERCIAL

## 2020-11-27 DIAGNOSIS — N20.0 CALCULUS OF KIDNEY: Chronic | ICD-10-CM

## 2020-11-27 DIAGNOSIS — Z98.82 BREAST IMPLANT STATUS: Chronic | ICD-10-CM

## 2020-11-27 DIAGNOSIS — Z98.890 OTHER SPECIFIED POSTPROCEDURAL STATES: Chronic | ICD-10-CM

## 2020-11-27 DIAGNOSIS — Z11.59 ENCOUNTER FOR SCREENING FOR OTHER VIRAL DISEASES: ICD-10-CM

## 2020-11-27 DIAGNOSIS — Z98.891 HISTORY OF UTERINE SCAR FROM PREVIOUS SURGERY: Chronic | ICD-10-CM

## 2020-11-27 DIAGNOSIS — Z90.710 ACQUIRED ABSENCE OF BOTH CERVIX AND UTERUS: Chronic | ICD-10-CM

## 2020-11-27 LAB — SARS-COV-2 RNA SPEC QL NAA+PROBE: SIGNIFICANT CHANGE UP

## 2020-11-27 PROCEDURE — U0003: CPT

## 2020-11-29 ENCOUNTER — TRANSCRIPTION ENCOUNTER (OUTPATIENT)
Age: 65
End: 2020-11-29

## 2020-11-30 ENCOUNTER — OUTPATIENT (OUTPATIENT)
Dept: OUTPATIENT SERVICES | Facility: HOSPITAL | Age: 65
LOS: 1 days | End: 2020-11-30
Payer: COMMERCIAL

## 2020-11-30 ENCOUNTER — APPOINTMENT (OUTPATIENT)
Dept: SURGERY | Facility: HOSPITAL | Age: 65
End: 2020-11-30
Payer: COMMERCIAL

## 2020-11-30 VITALS
OXYGEN SATURATION: 97 % | HEART RATE: 91 BPM | TEMPERATURE: 98 F | HEIGHT: 68.5 IN | WEIGHT: 199.08 LBS | SYSTOLIC BLOOD PRESSURE: 143 MMHG | DIASTOLIC BLOOD PRESSURE: 86 MMHG | RESPIRATION RATE: 18 BRPM

## 2020-11-30 VITALS
HEART RATE: 90 BPM | TEMPERATURE: 97 F | SYSTOLIC BLOOD PRESSURE: 133 MMHG | OXYGEN SATURATION: 100 % | DIASTOLIC BLOOD PRESSURE: 76 MMHG | RESPIRATION RATE: 16 BRPM

## 2020-11-30 DIAGNOSIS — Z90.710 ACQUIRED ABSENCE OF BOTH CERVIX AND UTERUS: Chronic | ICD-10-CM

## 2020-11-30 DIAGNOSIS — Z98.82 BREAST IMPLANT STATUS: Chronic | ICD-10-CM

## 2020-11-30 DIAGNOSIS — Z98.891 HISTORY OF UTERINE SCAR FROM PREVIOUS SURGERY: Chronic | ICD-10-CM

## 2020-11-30 DIAGNOSIS — Z98.890 OTHER SPECIFIED POSTPROCEDURAL STATES: Chronic | ICD-10-CM

## 2020-11-30 DIAGNOSIS — K43.9 VENTRAL HERNIA WITHOUT OBSTRUCTION OR GANGRENE: ICD-10-CM

## 2020-11-30 DIAGNOSIS — N20.0 CALCULUS OF KIDNEY: Chronic | ICD-10-CM

## 2020-11-30 DIAGNOSIS — K42.9 UMBILICAL HERNIA WITHOUT OBSTRUCTION OR GANGRENE: ICD-10-CM

## 2020-11-30 PROCEDURE — 49585: CPT

## 2020-11-30 PROCEDURE — C1781: CPT

## 2020-11-30 PROCEDURE — 49570: CPT

## 2020-11-30 PROCEDURE — C9399: CPT

## 2020-11-30 RX ORDER — VENLAFAXINE HCL 75 MG
1 CAPSULE, EXT RELEASE 24 HR ORAL
Qty: 0 | Refills: 0 | DISCHARGE

## 2020-11-30 RX ORDER — OXYCODONE HYDROCHLORIDE 5 MG/1
5 TABLET ORAL ONCE
Refills: 0 | Status: DISCONTINUED | OUTPATIENT
Start: 2020-11-30 | End: 2020-11-30

## 2020-11-30 RX ORDER — DIPHENHYDRAMINE HCL 50 MG
0 CAPSULE ORAL
Qty: 0 | Refills: 0 | DISCHARGE

## 2020-11-30 RX ORDER — OXYCODONE HYDROCHLORIDE 5 MG/1
1 TABLET ORAL
Qty: 15 | Refills: 0
Start: 2020-11-30

## 2020-11-30 RX ORDER — ONDANSETRON 8 MG/1
4 TABLET, FILM COATED ORAL ONCE
Refills: 0 | Status: DISCONTINUED | OUTPATIENT
Start: 2020-11-30 | End: 2020-11-30

## 2020-11-30 RX ORDER — LIDOCAINE HCL 20 MG/ML
0.2 VIAL (ML) INJECTION ONCE
Refills: 0 | Status: DISCONTINUED | OUTPATIENT
Start: 2020-11-30 | End: 2020-11-30

## 2020-11-30 RX ORDER — HYDROMORPHONE HYDROCHLORIDE 2 MG/ML
0.25 INJECTION INTRAMUSCULAR; INTRAVENOUS; SUBCUTANEOUS
Refills: 0 | Status: DISCONTINUED | OUTPATIENT
Start: 2020-11-30 | End: 2020-11-30

## 2020-11-30 RX ORDER — SIMVASTATIN 20 MG/1
1 TABLET, FILM COATED ORAL
Qty: 0 | Refills: 0 | DISCHARGE

## 2020-11-30 RX ORDER — KETOROLAC TROMETHAMINE 30 MG/ML
30 SYRINGE (ML) INJECTION ONCE
Refills: 0 | Status: DISCONTINUED | OUTPATIENT
Start: 2020-11-30 | End: 2020-11-30

## 2020-11-30 RX ORDER — APREPITANT 80 MG/1
40 CAPSULE ORAL ONCE
Refills: 0 | Status: COMPLETED | OUTPATIENT
Start: 2020-11-30 | End: 2020-11-30

## 2020-11-30 RX ORDER — SODIUM CHLORIDE 9 MG/ML
3 INJECTION INTRAMUSCULAR; INTRAVENOUS; SUBCUTANEOUS EVERY 8 HOURS
Refills: 0 | Status: DISCONTINUED | OUTPATIENT
Start: 2020-11-30 | End: 2020-11-30

## 2020-11-30 RX ORDER — LORATADINE, PSEUDOEPHEDRINE SULFATE 5; 120 MG/1; MG/1
1 TABLET, FILM COATED, EXTENDED RELEASE ORAL
Qty: 0 | Refills: 0 | DISCHARGE

## 2020-11-30 RX ORDER — FENTANYL CITRATE 50 UG/ML
50 INJECTION INTRAVENOUS
Refills: 0 | Status: DISCONTINUED | OUTPATIENT
Start: 2020-11-30 | End: 2020-11-30

## 2020-11-30 RX ORDER — CHLORHEXIDINE GLUCONATE 213 G/1000ML
1 SOLUTION TOPICAL ONCE
Refills: 0 | Status: DISCONTINUED | OUTPATIENT
Start: 2020-11-30 | End: 2020-11-30

## 2020-11-30 RX ORDER — ALPRAZOLAM 0.25 MG
1 TABLET ORAL
Qty: 0 | Refills: 0 | DISCHARGE

## 2020-11-30 RX ORDER — ZOLPIDEM TARTRATE 10 MG/1
1 TABLET ORAL
Qty: 0 | Refills: 0 | DISCHARGE

## 2020-11-30 RX ADMIN — Medication 30 MILLIGRAM(S): at 18:46

## 2020-11-30 RX ADMIN — OXYCODONE HYDROCHLORIDE 5 MILLIGRAM(S): 5 TABLET ORAL at 15:21

## 2020-11-30 RX ADMIN — HYDROMORPHONE HYDROCHLORIDE 0.25 MILLIGRAM(S): 2 INJECTION INTRAMUSCULAR; INTRAVENOUS; SUBCUTANEOUS at 14:30

## 2020-11-30 RX ADMIN — OXYCODONE HYDROCHLORIDE 5 MILLIGRAM(S): 5 TABLET ORAL at 16:00

## 2020-11-30 RX ADMIN — APREPITANT 40 MILLIGRAM(S): 80 CAPSULE ORAL at 10:33

## 2020-11-30 RX ADMIN — Medication 30 MILLIGRAM(S): at 18:26

## 2020-11-30 RX ADMIN — HYDROMORPHONE HYDROCHLORIDE 0.25 MILLIGRAM(S): 2 INJECTION INTRAMUSCULAR; INTRAVENOUS; SUBCUTANEOUS at 14:21

## 2020-11-30 NOTE — ASU DISCHARGE PLAN (ADULT/PEDIATRIC) - CARE PROVIDER_API CALL
Frankie Marcus J  SURGERY  [address]  [place], [place] [id number]  Phone: [phone]  Fax: [phone]  Follow Up Time:

## 2020-11-30 NOTE — BRIEF OPERATIVE NOTE - NSICDXBRIEFPROCEDURE_GEN_ALL_CORE_FT
PROCEDURES:  Repair of epigastric, ventral, or incisional hernia of abdominal wall using mesh 30-Nov-2020 14:14:18  Nettie Rocha  Repair of umbilical hernia in adult 30-Nov-2020 14:14:01  Nettie Rocha

## 2020-11-30 NOTE — PRE-ANESTHESIA EVALUATION ADULT - NSANTHPMHFT_GEN_ALL_CORE
66 y/o F with PMHx of Raynaud Disease, HLD, renal calculus s/p laser lithotripsy 09/2020, anxiety presents for elective repair of epigastric hernia with mesh.  Covid Negative 11/27.

## 2020-11-30 NOTE — PRE-ANESTHESIA EVALUATION ADULT - NSANTHPEFT_GEN_ALL_CORE
GENERAL: NAD  HEAD:  Atraumatic, Normocephalic  CHEST/LUNG: Clear to auscultation bilaterally  HEART: Normal S1/S2  PSYCH: AAOx3  NEUROLOGY: non-focal  SKIN: No obvious rashes or lesions.

## 2020-11-30 NOTE — ASU DISCHARGE PLAN (ADULT/PEDIATRIC) - ASU DC SPECIAL INSTRUCTIONSFT
Remove the outer dressing in 48 hours and shower. Leave the steri strips in place, they will fall off on their own.  Resume a regular diet and all of your home medications  Take tylenol and motrin for pain as needed. Take oxycodone for severe pain as needed as prescribed.  Call the office to make a follow up appointment with Dr. Marcus in two weeks.  Do not do heavy lifting or exert yourself until cleared by Dr. Marcus

## 2020-12-03 PROBLEM — E78.5 HYPERLIPIDEMIA, UNSPECIFIED: Chronic | Status: ACTIVE | Noted: 2020-11-23

## 2020-12-03 PROBLEM — M20.41 OTHER HAMMER TOE(S) (ACQUIRED), RIGHT FOOT: Chronic | Status: ACTIVE | Noted: 2017-04-24

## 2020-12-03 PROBLEM — F41.9 ANXIETY DISORDER, UNSPECIFIED: Chronic | Status: ACTIVE | Noted: 2020-11-23

## 2020-12-03 PROBLEM — Z87.442 PERSONAL HISTORY OF URINARY CALCULI: Chronic | Status: ACTIVE | Noted: 2020-11-23

## 2020-12-03 PROBLEM — M20.11 HALLUX VALGUS (ACQUIRED), RIGHT FOOT: Chronic | Status: ACTIVE | Noted: 2017-04-24

## 2020-12-03 PROBLEM — R55 SYNCOPE AND COLLAPSE: Chronic | Status: ACTIVE | Noted: 2017-04-24

## 2020-12-15 PROBLEM — Z09 POSTOPERATIVE EXAMINATION: Status: ACTIVE | Noted: 2020-12-15

## 2020-12-16 ENCOUNTER — APPOINTMENT (OUTPATIENT)
Dept: SURGERY | Facility: CLINIC | Age: 65
End: 2020-12-16
Payer: COMMERCIAL

## 2020-12-16 VITALS
OXYGEN SATURATION: 97 % | DIASTOLIC BLOOD PRESSURE: 100 MMHG | RESPIRATION RATE: 15 BRPM | SYSTOLIC BLOOD PRESSURE: 153 MMHG | HEART RATE: 76 BPM | TEMPERATURE: 97.9 F

## 2020-12-16 DIAGNOSIS — Z09 ENCOUNTER FOR FOLLOW-UP EXAMINATION AFTER COMPLETED TREATMENT FOR CONDITIONS OTHER THAN MALIGNANT NEOPLASM: ICD-10-CM

## 2020-12-16 DIAGNOSIS — K42.9 UMBILICAL HERNIA W/OUT OBSTRUCTION OR GANGRENE: ICD-10-CM

## 2020-12-16 DIAGNOSIS — K43.9 VENTRAL HERNIA W/OUT OBSTRUCTION OR GANGRENE: ICD-10-CM

## 2020-12-16 PROCEDURE — 99024 POSTOP FOLLOW-UP VISIT: CPT

## 2020-12-16 NOTE — PLAN
[FreeTextEntry1] : Patient advised to gradually liberalize activities as tolerated and to use MiraLax as needed for constipation.

## 2020-12-16 NOTE — PHYSICAL EXAM
[de-identified] : Soft, nondistended, nontender. Upper midline incision healing well with normal ridge. Repair fully intact. No palpable seroma or signs of infection.

## 2020-12-16 NOTE — HISTORY OF PRESENT ILLNESS
[de-identified] : Lashon is a 64 y/o female here for post-operative visit. 11/30/20-  repair of epigastric and umbilical hernias with medium oval. \par  [de-identified] : Status post umbilical and epigastric hernia repair on 11//20. At present has mild residual discomfort. Has also noted some perioperative constipation.

## 2020-12-16 NOTE — ASSESSMENT
[FreeTextEntry1] : Status post repair of epigastric and umbilical hernias with normal postop course other than some constipation.

## 2022-02-14 ENCOUNTER — APPOINTMENT (OUTPATIENT)
Dept: NEUROLOGY | Facility: CLINIC | Age: 67
End: 2022-02-14

## 2022-08-31 NOTE — ASU PREOP CHECKLIST - NOTHING BY MOUTH SINCE
Slade Gamboa,    Thank you for allowing [facility] to manage your care.      Continue PT. You can use topicals like Biofreeze , Aspercreme     For your convenience, test results are released as soon as they are available  Please allow 1-2 business days for me to send you a comment about your results.  If not done so, I encourage you to login into [employer] ([email or web address] to review your results in real time.     If you have any questions or concerns, please feel free to call us at [phone].    Sincerely,    Dr. Sosa    Did you know?      You can schedule a video visit for follow-up appointments as well as future appointments for certain conditions.  Please see the below link.     [email or web address]    If you have not already done so,  I encourage you to sign up for [employer] ([email or web address]).  This will allow you to review your results, securely communicate with a provider, and schedule virtual visits as well.    
29-Nov-2020 22:00

## 2023-02-24 NOTE — H&P PST ADULT - EYES
[FreeTextEntry1] : AWA is a 14 year old M who presents for evaluation of bilateral foot pain, left worse than the right.\par \par He is brought in today by his Mom.\par \par In October the patient was diagnosed with plantar fasciitis by Dr. Vega and improved after resting for several weeks from sports.  Family reports he was better by [place] time.  However the first day he returned to soccer, he had an injury while playing soccer and developed left foot pain on the medial aspect of his foot.  He describes the injury as a lunge forward onto his left leg.  Initially he continued to play and iced the foot.  The left foot got better and now the right foot is beginning to hurt in the same area without any new injury.  He feels that it swollen there. Since then he has continued to play soccer and he continues to have pain. He feels pain when he walks on it. He was seen by Dr. Salas at [facility] on 1/18/23.  X-rays were negative for any osseous abnormalities.  He was diagnosed with a left foot contusion.  Recommendation was to refrain from activities for 2 to 4 weeks.  He recently got a referral for physical therapy from his pediatrician.  He has gone 2 times and was diagnosed with tendinitis.\par \par He presents today for a second opinion. He reports the pain is on the inside part of both feet.  The pain has always been in the same location.\par  EOMI; PERRL; no drainage or redness